# Patient Record
Sex: MALE | Race: WHITE | Employment: FULL TIME | URBAN - METROPOLITAN AREA
[De-identification: names, ages, dates, MRNs, and addresses within clinical notes are randomized per-mention and may not be internally consistent; named-entity substitution may affect disease eponyms.]

---

## 2018-12-14 ENCOUNTER — THERAPY VISIT (OUTPATIENT)
Dept: PHYSICAL THERAPY | Facility: CLINIC | Age: 28
End: 2018-12-14
Payer: COMMERCIAL

## 2018-12-14 DIAGNOSIS — M25.511 SHOULDER PAIN, RIGHT: Primary | ICD-10-CM

## 2018-12-14 PROCEDURE — 97161 PT EVAL LOW COMPLEX 20 MIN: CPT | Mod: GP | Performed by: PHYSICAL THERAPIST

## 2018-12-14 PROCEDURE — 97110 THERAPEUTIC EXERCISES: CPT | Mod: GP | Performed by: PHYSICAL THERAPIST

## 2018-12-14 NOTE — PROGRESS NOTES
[facility] Initial Evaluation  Subjective:  The history is provided by the patient.   Jimenez Barrientos is a 28 year old male with a right shoulder condition.  Condition occurred with:  Repetition/overuse.  Condition occurred: at home, in the community and during recreation/sport.  This is a chronic condition  Pt reports chronic 3 year history of R shoulder/scapular pain. No injury, reports gradual onset after pushing snowblower up a hill. Describes pain as a dull ache and are located on medial inferior board of R scapula and can get radiating symptoms from posterolateral R shoulder down the back of the arm to digits 4 and 5. Working on computer causes extra symptoms, gets symptoms after 1-2 hours. Also gets pain putting R UE behind body (tucking shirt in). Neck stretching helps and WB through R UE (hand stands or side planks) helps with symptoms. Works as  doing a lot of lab/computer work. Has history of R clavicular fx and minor R shoulder pain/issues..    Patient reports pain:  Lateral.  Radiates to:  Upper arm and lower arm (Digits 4-5).  Pain is described as aching and is constant and reported as 1/10.  Associated symptoms:  Catching and locking.        Special tests:  X-ray (No significant findings in shoulder x-ray).  Previous treatment: Massage.  There was mild improvement following previous treatment.  General health as reported by patient is excellent.          Current occupation is .                                    Objective:  System              Cervical/Thoracic Evaluation  Cervical AROM: normal (No increase in sxs with over pressure)   Thoracic AROM: normal        Cervical Myotomes:  normal                    Neural Tension:      Right side:  Ulnar and Median positive.  Right side:  Radial  negative.  Cervical Dermatomes:  normal                    Cervical Palpation:  : Tightness at scalenes.                   Shoulder Evaluation:  ROM:  AROM:   normal                                  Strength:  normal                      Stability Testing:  normal      Special Tests:        Right shoulder negative for the following special tests:Labral; Impingement; Bursal; Neural Tension and Rotator cuff tear  Palpation:  Palpation assessed shoulder: Tightness noted in pec minor.      Mobility Tests:            Scapulothoracic left:  Normal  Scapulothoracic right:  Normal                                       General     ROS    Assessment/Plan:    Patient is a 28 year old male with right side shoulder complaints.    Patient has the following significant findings with corresponding treatment plan.                Diagnosis 1:  R shoulder pain  Pain -  hot/cold therapy, US, electric stimulation, mechanical traction, manual therapy, splint/taping/bracing/orthotics, self management, education, directional preference exercise and home program  Decreased ROM/flexibility - manual therapy, therapeutic exercise and home program  Decreased joint mobility - manual therapy, therapeutic exercise and home program  Decreased strength - therapeutic exercise, therapeutic activities and home program  Impaired muscle performance - neuro re-education and home program  Decreased function - therapeutic activities and home program    Therapy Evaluation Codes:   1) History comprised of:   Personal factors that impact the plan of care:      None.    Comorbidity factors that impact the plan of care are:      None.     Medications impacting care: None.  2) Examination of Body Systems comprised of:   Body structures and functions that impact the plan of care:      Shoulder.   Activity limitations that impact the plan of care are:      Dressing, Lifting and Reading/Computer work.  3) Clinical presentation characteristics are:   Stable/Uncomplicated.  4) Decision-Making    Low complexity using standardized patient assessment instrument and/or measureable assessment of functional outcome.  Cumulative  Therapy Evaluation is: Low complexity.    Previous and current functional limitations:  (See Goal Flow Sheet for this information)    Short term and Long term goals: (See Goal Flow Sheet for this information)     Communication ability:  Patient appears to be able to clearly communicate and understand verbal and written communication and follow directions correctly.  Treatment Explanation - The following has been discussed with the patient:   RX ordered/plan of care  Anticipated outcomes  Possible risks and side effects  This patient would benefit from PT intervention to resume normal activities.   Rehab potential is good.    Frequency:  1 X week, once daily  Duration:  for 6 weeks  Discharge Plan:  Achieve all LTG.  Independent in home treatment program.  Reach maximal therapeutic benefit.    Please refer to the daily flowsheet for treatment today, total treatment time and time spent performing 1:1 timed codes.

## 2018-12-20 ENCOUNTER — THERAPY VISIT (OUTPATIENT)
Dept: PHYSICAL THERAPY | Facility: CLINIC | Age: 28
End: 2018-12-20
Payer: COMMERCIAL

## 2018-12-20 DIAGNOSIS — G54.0 THORACIC OUTLET SYNDROME: Primary | ICD-10-CM

## 2018-12-20 PROCEDURE — 97110 THERAPEUTIC EXERCISES: CPT | Mod: GP | Performed by: PHYSICAL THERAPIST

## 2018-12-20 PROCEDURE — 97112 NEUROMUSCULAR REEDUCATION: CPT | Mod: GP | Performed by: PHYSICAL THERAPIST

## 2018-12-20 PROCEDURE — 97140 MANUAL THERAPY 1/> REGIONS: CPT | Mod: GP | Performed by: PHYSICAL THERAPIST

## 2018-12-28 ENCOUNTER — THERAPY VISIT (OUTPATIENT)
Dept: PHYSICAL THERAPY | Facility: CLINIC | Age: 28
End: 2018-12-28
Payer: COMMERCIAL

## 2018-12-28 DIAGNOSIS — M25.511 SHOULDER PAIN, RIGHT: Primary | ICD-10-CM

## 2018-12-28 PROCEDURE — 97110 THERAPEUTIC EXERCISES: CPT | Mod: GP | Performed by: PHYSICAL THERAPIST

## 2018-12-28 PROCEDURE — 97112 NEUROMUSCULAR REEDUCATION: CPT | Mod: GP | Performed by: PHYSICAL THERAPIST

## 2018-12-28 PROCEDURE — 97140 MANUAL THERAPY 1/> REGIONS: CPT | Mod: GP | Performed by: PHYSICAL THERAPIST

## 2019-02-21 ENCOUNTER — THERAPY VISIT (OUTPATIENT)
Dept: PHYSICAL THERAPY | Facility: CLINIC | Age: 29
End: 2019-02-21
Payer: COMMERCIAL

## 2019-02-21 DIAGNOSIS — M24.551 RIGHT HIP FLEXOR TIGHTNESS: ICD-10-CM

## 2019-02-21 DIAGNOSIS — M25.561 PATELLOFEMORAL ARTHRALGIA OF RIGHT KNEE: ICD-10-CM

## 2019-02-21 PROCEDURE — 97110 THERAPEUTIC EXERCISES: CPT | Mod: GP | Performed by: PHYSICAL THERAPIST

## 2019-02-21 PROCEDURE — 97161 PT EVAL LOW COMPLEX 20 MIN: CPT | Mod: GP | Performed by: PHYSICAL THERAPIST

## 2019-02-21 ASSESSMENT — ACTIVITIES OF DAILY LIVING (ADL)
LIMPING: I HAVE THE SYMPTOM BUT IT DOES NOT AFFECT MY ACTIVITY
GO UP STAIRS: ACTIVITY IS MINIMALLY DIFFICULT
RAW_SCORE: 55
WALKING_DOWN_STEEP_HILLS: SLIGHT DIFFICULTY
HOS_ADL_ITEM_SCORE_TOTAL: 49
WALKING_INITIALLY: SLIGHT DIFFICULTY
WEAKNESS: I HAVE THE SYMPTOM BUT IT DOES NOT AFFECT MY ACTIVITY
SWELLING: I DO NOT HAVE THE SYMPTOM
GO DOWN STAIRS: ACTIVITY IS MINIMALLY DIFFICULT
HOW_WOULD_YOU_RATE_THE_OVERALL_FUNCTION_OF_YOUR_KNEE_DURING_YOUR_USUAL_DAILY_ACTIVITIES?: NEARLY NORMAL
KNEEL ON THE FRONT OF YOUR KNEE: ACTIVITY IS SOMEWHAT DIFFICULT
KNEE_ACTIVITY_OF_DAILY_LIVING_SUM: 55
GETTING_INTO_AND_OUT_OF_AN_AVERAGE_CAR: SLIGHT DIFFICULTY
RECREATIONAL_ACTIVITIES: MODERATE DIFFICULTY
GETTING_INTO_AND_OUT_OF_A_BATHTUB: SLIGHT DIFFICULTY
STAND: ACTIVITY IS MINIMALLY DIFFICULT
GOING_DOWN_1_FLIGHT_OF_STAIRS: SLIGHT DIFFICULTY
SIT WITH YOUR KNEE BENT: ACTIVITY IS MINIMALLY DIFFICULT
WALKING_15_MINUTES_OR_GREATER: SLIGHT DIFFICULTY
GIVING WAY, BUCKLING OR SHIFTING OF KNEE: I DO NOT HAVE THE SYMPTOM
DEEP_SQUATTING: MODERATE DIFFICULTY
SITTING_FOR_15_MINUTES: SLIGHT DIFFICULTY
TWISTING/PIVOTING_ON_INVOLVED_LEG: SLIGHT DIFFICULTY
SQUAT: ACTIVITY IS SOMEWHAT DIFFICULT
HOS_ADL_SCORE(%): 72.06
ROLLING_OVER_IN_BED: SLIGHT DIFFICULTY
HEAVY_WORK: SLIGHT DIFFICULTY
STIFFNESS: I DO NOT HAVE THE SYMPTOM
HOS_ADL_COUNT: 17
WALKING_UP_STEEP_HILLS: SLIGHT DIFFICULTY
PUTTING_ON_SOCKS_AND_SHOES: SLIGHT DIFFICULTY
WALK: ACTIVITY IS MINIMALLY DIFFICULT
STANDING_FOR_15_MINUTES: SLIGHT DIFFICULTY
STEPPING_UP_AND_DOWN_CURBS: SLIGHT DIFFICULTY
AS_A_RESULT_OF_YOUR_KNEE_INJURY,_HOW_WOULD_YOU_RATE_YOUR_CURRENT_LEVEL_OF_DAILY_ACTIVITY?: NORMAL
GOING_UP_1_FLIGHT_OF_STAIRS: SLIGHT DIFFICULTY
HOS_ADL_HIGHEST_POTENTIAL_SCORE: 68
HOW_WOULD_YOU_RATE_THE_CURRENT_FUNCTION_OF_YOUR_KNEE_DURING_YOUR_USUAL_DAILY_ACTIVITIES_ON_A_SCALE_FROM_0_TO_100_WITH_100_BEING_YOUR_LEVEL_OF_KNEE_FUNCTION_PRIOR_TO_YOUR_INJURY_AND_0_BEING_THE_INABILITY_TO_PERFORM_ANY_OF_YOUR_USUAL_DAILY_ACTIVITIES?: 90
LIGHT_TO_MODERATE_WORK: SLIGHT DIFFICULTY
KNEE_ACTIVITY_OF_DAILY_LIVING_SCORE: 78.57
PAIN: THE SYMPTOM AFFECTS MY ACTIVITY MODERATELY
HOW_WOULD_YOU_RATE_YOUR_CURRENT_LEVEL_OF_FUNCTION_DURING_YOUR_USUAL_ACTIVITIES_OF_DAILY_LIVING_FROM_0_TO_100_WITH_100_BEING_YOUR_LEVEL_OF_FUNCTION_PRIOR_TO_YOUR_HIP_PROBLEM_AND_0_BEING_THE_INABILITY_TO_PERFORM_ANY_OF_YOUR_USUAL_DAILY_ACTIVITIES?: 92
WALKING_APPROXIMATELY_10_MINUTES: SLIGHT DIFFICULTY
RISE FROM A CHAIR: ACTIVITY IS MINIMALLY DIFFICULT

## 2019-02-21 NOTE — LETTER
[facility] PHYSICAL THERAPY  [address] [address]  [phone]    2019    Re: Jimenez Barrientos   :   1990  MRN:  [id number]   REFERRING PHYSICIAN:   Jason Alaniz    [facility] PHYSICAL THERAPY    Date of Initial Evaluation:  2019  Visits:  Rxs Used: 1  Reason for Referral:     Patellofemoral arthralgia of right knee  Right hip flexor tightness    EVALUATION SUMMARY    [facility] Initial Evaluation  Subjective:  The history is provided by the patient. Jimenez Barrientos is a 28 year old male with a right knee condition.  Condition occurred with:  Repetition/overuse.  Condition occurred: during recreation/sport.  This is a chronic condition  PT reports a 6 year history of R knee pain. Locates pain around medial and superior boarder of R patella. Describes pain as dull and achy. Pain worse with biking and hiking as well as doing deep squats for working. Also notes pain at random when sitting. Pt has tried strengthening hips and that seems to have helped a little..  Patient reports pain:  Medial.    Pain is described as aching and is intermittent and reported as 3/10.  Since onset symptoms are unchanged.  General health as reported by patient is good.    Medical allergies: no.  Other surgeries include:  None reported.  Jimenez Barrientos is a 28 year old male with a right hip condition.  Condition occurred with:  Repetition/overuse.  Condition occurred: during recreation/sport.  This is a chronic condition  Pt reports 3 month history of R anterior hip pain. Started running on treadmill about 3 months ago and noticed onset of R hip pain. Locates pain in anterior aspect of hip. Describes pain as a constant dull achy pain. Pain is consistent intensity no matter what he does. Pt has tried to stretch and strengthen hip but with little progress. Also notes a lateral snapping sensation in his right hip as well, but denies pain with snapping..   Patient reports pain:  Anterior.    Pain is described as aching  and reported as 3/10.  General health as reported by patient is good.  Pertinent medical history includes:  None.  Medical allergies: no.  Other surgeries include:  None reported.  Current medications:  None as reported by the patient.  Current occupation is .  Patient is working in normal job without restrictions.      Objective:  System  Hip Evaluation  HIP AROM:  AROM:   Left Hip:     Normal    Right Hip:    Hip Strength:    Flexion:   Left: 5-/5   Pain:  Right: 5-/5   Pain:               Extension:  Left: 5-/5  Pain:Right: 5-/5    Pain:    Abduction:  Left: 5-/5     Pain:Right: 5-/5    Pain:  Knee Flexion:  Left: 5/5   Pain:Right: 5/5   Pain:  Knee Extension:  Left: 5/5   Pain:Right: 5/5    Pain:  Hip Special Testing:    Left hip negative for the following special tests:  Toshia or Fadir/Labrum   Right hip positive for the following special tests:  Toshia and Fadir/Labrum    Hip Palpation:    Right hip tenderness present at:  hip flexors       Knee Evaluation:  ROM:  AROM: normal    Strength:   Quad Set Left: Good    Pain:   Quad Set Right: Good    Pain:  Special Tests:   Left knee negative for the following special tests:  Meninscal  Right knee positive for the following tests:  Patellar Compression  Right knee negative for the following special tests:  Meniscal  Palpation:  Normal  Edema:  Normal  Mobility Testing:    Patellofemoral Medial:  Left: normal    Right: normal  Patellofemoral Lateral:  Left: normal    Right: normal  Patellofemoral Superior:  Left: normal    Right: normal  Patellofemoral Inferior:  Left: normal    Right: normal  Functional Testing:    Quad:    Single Leg Squat:  Left:      Mild loss of control, hip substitution, femoral IR and excessive anterior knee excursion  Right:       Mild loss of control, hip substitution, femoral IR and excessive anterior knee excursion  Bilateral Leg Squat:        Assessment/Plan:     Patient is a 28 year old male with right side hip and right side knee complaints.    Patient has the following significant findings with corresponding treatment plan.                Diagnosis 1:  R PFPS  Pain -  hot/cold therapy, US, electric stimulation, mechanical traction, manual therapy, splint/taping/bracing/orthotics, self management, education, directional preference exercise and home program  Decreased ROM/flexibility - manual therapy, therapeutic exercise and home program  Decreased joint mobility - manual therapy, therapeutic exercise and home program  Decreased strength - therapeutic exercise, therapeutic activities and home program  Impaired balance - neuro re-education, therapeutic activities and adaptive equipment/assistive device  Decreased proprioception - neuro re-education, therapeutic activities and home program  Impaired muscle performance - neuro re-education and home program  Decreased function - therapeutic activities and home program  Diagnosis 2:  R hip flexor pain   Pain -  hot/cold therapy, US, electric stimulation, mechanical traction, manual therapy, splint/taping/bracing/orthotics, self management, education, directional preference exercise and home program  Decreased ROM/flexibility - manual therapy, therapeutic exercise and home program  Decreased joint mobility - manual therapy, therapeutic exercise and home program  Decreased strength - therapeutic exercise, therapeutic activities and home program  Impaired balance - neuro re-education, therapeutic activities and home program  Decreased proprioception - neuro re-education, therapeutic activities and home program  Impaired muscle performance - neuro re-education and home program  Decreased function - therapeutic activities and home program  Therapy Evaluation Codes:   1) History comprised of:   Personal factors that impact the plan of care:      None.    Comorbidity factors that impact the plan of care are:      None.      Medications impacting care: None.  2) Examination of Body Systems comprised of:   Body structures and functions that impact the plan of care:      Hip and Knee.   Activity limitations that impact the plan of care are:      Dressing, Jumping, Lifting, Running, Sitting, Sports, Squatting/kneeling, Stairs, Standing, Walking and Working.  3) Clinical presentation characteristics are:   Stable/Uncomplicated.  4) Decision-Making    Low complexity using standardized patient assessment instrument and/or measureable assessment of functional outcome.  Cumulative Therapy Evaluation is: Low complexity.  Previous and current functional limitations:  (See Goal Flow Sheet for this information)    Short term and Long term goals: (See Goal Flow Sheet for this information)   Communication ability:  Patient appears to be able to clearly communicate and understand verbal and written communication and follow directions correctly.  Treatment Explanation - The following has been discussed with the patient:   RX ordered/plan of care  Anticipated outcomes  Possible risks and side effects  This patient would benefit from PT intervention to resume normal activities.   Rehab potential is good.  Frequency:  1 X week, once daily  Duration:  for 8 weeks  Discharge Plan:  Achieve all LTG.  Independent in home treatment program.  Reach maximal therapeutic benefit.        Thank you for your referral.    INQUIRIES  Therapist: JUAN JOSE GREENFIELD [facility] PHYSICAL THERAPY  [address] [address]  Phone: [phone]  Fax: [phone]

## 2019-02-21 NOTE — PROGRESS NOTES
[facility] Initial Evaluation  Subjective:  The history is provided by the patient.   Jimenez Barrientos is a 28 year old male with a right knee condition.  Condition occurred with:  Repetition/overuse.  Condition occurred: during recreation/sport.  This is a chronic condition  PT reports a 6 year history of R knee pain. Locates pain around medial and superior boarder of R patella. Describes pain as dull and achy. Pain worse with biking and hiking as well as doing deep squats for working. Also notes pain at random when sitting. Pt has tried strengthening hips and that seems to have helped a little..    Patient reports pain:  Medial.    Pain is described as aching and is intermittent and reported as 3/10.        Since onset symptoms are unchanged.        General health as reported by patient is good.    Medical allergies: no.  Other surgeries include:  None reported.                      Jimenez Barrientos is a 28 year old male with a right hip condition.  Condition occurred with:  Repetition/overuse.  Condition occurred: during recreation/sport.  This is a chronic condition  Pt reports 3 month history of R anterior hip pain. Started running on treadmill about 3 months ago and noticed onset of R hip pain. Locates pain in anterior aspect of hip. Describes pain as a constant dull achy pain. Pain is consistent intensity no matter what he does. Pt has tried to stretch and strengthen hip but with little progress. Also notes a lateral snapping sensation in his right hip as well, but denies pain with snapping..    Patient reports pain:  Anterior.    Pain is described as aching  and reported as 3/10.                General health as reported by patient is good.  Pertinent medical history includes:  None.  Medical allergies: no.  Other surgeries include:  None reported.  Current medications:  None as reported by the patient.  Current occupation is .  Patient is working in normal job without  restrictions.                                  Objective:  System                                           Hip Evaluation  HIP AROM:  AROM:   Left Hip:     Normal    Right Hip:                      Hip Strength:    Flexion:   Left: 5-/5   Pain:  Right: 5-/5   Pain:                    Extension:  Left: 5-/5  Pain:Right: 5-/5    Pain:    Abduction:  Left: 5-/5     Pain:Right: 5-/5    Pain:        Knee Flexion:  Left: 5/5   Pain:Right: 5/5   Pain:  Knee Extension:  Left: 5/5   Pain:Right: 5/5    Pain:        Hip Special Testing:      Left hip negative for the following special tests:  Toshia or Fadir/Labrum   Right hip positive for the following special tests:  Toshia and Fadir/Labrum    Hip Palpation:      Right hip tenderness present at:  hip flexors       Knee Evaluation:  ROM:  AROM: normal            Strength:         Quad Set Left: Good    Pain:   Quad Set Right: Good    Pain:    Special Tests:     Left knee negative for the following special tests:  Meninscal  Right knee positive for the following tests:  Patellar Compression  Right knee negative for the following special tests:  Meniscal  Palpation:  Normal      Edema:  Normal    Mobility Testing:      Patellofemoral Medial:  Left: normal    Right: normal  Patellofemoral Lateral:  Left: normal    Right: normal  Patellofemoral Superior:  Left: normal    Right: normal  Patellofemoral Inferior:  Left: normal    Right: normal  Functional Testing:          Quad:    Single Leg Squat:  Left:      Mild loss of control, hip substitution, femoral IR and excessive anterior knee excursion  Right:       Mild loss of control, hip substitution, femoral IR and excessive anterior knee excursion  Bilateral Leg Squat:                General     ROS    Assessment/Plan:    Patient is a 28 year old male with right side hip and right side knee complaints.    Patient has the following significant findings with corresponding treatment plan.                Diagnosis 1:  R PFPS  Pain -   hot/cold therapy, US, electric stimulation, mechanical traction, manual therapy, splint/taping/bracing/orthotics, self management, education, directional preference exercise and home program  Decreased ROM/flexibility - manual therapy, therapeutic exercise and home program  Decreased joint mobility - manual therapy, therapeutic exercise and home program  Decreased strength - therapeutic exercise, therapeutic activities and home program  Impaired balance - neuro re-education, therapeutic activities and adaptive equipment/assistive device  Decreased proprioception - neuro re-education, therapeutic activities and home program  Impaired muscle performance - neuro re-education and home program  Decreased function - therapeutic activities and home program  Diagnosis 2:  R hip flexor pain   Pain -  hot/cold therapy, US, electric stimulation, mechanical traction, manual therapy, splint/taping/bracing/orthotics, self management, education, directional preference exercise and home program  Decreased ROM/flexibility - manual therapy, therapeutic exercise and home program  Decreased joint mobility - manual therapy, therapeutic exercise and home program  Decreased strength - therapeutic exercise, therapeutic activities and home program  Impaired balance - neuro re-education, therapeutic activities and home program  Decreased proprioception - neuro re-education, therapeutic activities and home program  Impaired muscle performance - neuro re-education and home program  Decreased function - therapeutic activities and home program    Therapy Evaluation Codes:   1) History comprised of:   Personal factors that impact the plan of care:      None.    Comorbidity factors that impact the plan of care are:      None.     Medications impacting care: None.  2) Examination of Body Systems comprised of:   Body structures and functions that impact the plan of care:      Hip and Knee.   Activity limitations that impact the plan of care are:       Dressing, Jumping, Lifting, Running, Sitting, Sports, Squatting/kneeling, Stairs, Standing, Walking and Working.  3) Clinical presentation characteristics are:   Stable/Uncomplicated.  4) Decision-Making    Low complexity using standardized patient assessment instrument and/or measureable assessment of functional outcome.  Cumulative Therapy Evaluation is: Low complexity.    Previous and current functional limitations:  (See Goal Flow Sheet for this information)    Short term and Long term goals: (See Goal Flow Sheet for this information)     Communication ability:  Patient appears to be able to clearly communicate and understand verbal and written communication and follow directions correctly.  Treatment Explanation - The following has been discussed with the patient:   RX ordered/plan of care  Anticipated outcomes  Possible risks and side effects  This patient would benefit from PT intervention to resume normal activities.   Rehab potential is good.    Frequency:  1 X week, once daily  Duration:  for 8 weeks  Discharge Plan:  Achieve all LTG.  Independent in home treatment program.  Reach maximal therapeutic benefit.    Please refer to the daily flowsheet for treatment today, total treatment time and time spent performing 1:1 timed codes.

## 2019-02-27 ENCOUNTER — THERAPY VISIT (OUTPATIENT)
Dept: PHYSICAL THERAPY | Facility: CLINIC | Age: 29
End: 2019-02-27
Payer: COMMERCIAL

## 2019-02-27 DIAGNOSIS — M24.551 RIGHT HIP FLEXOR TIGHTNESS: ICD-10-CM

## 2019-02-27 DIAGNOSIS — M25.561 PATELLOFEMORAL ARTHRALGIA OF RIGHT KNEE: ICD-10-CM

## 2019-02-27 PROCEDURE — 97112 NEUROMUSCULAR REEDUCATION: CPT | Mod: GP | Performed by: PHYSICAL THERAPIST

## 2019-02-27 PROCEDURE — 97110 THERAPEUTIC EXERCISES: CPT | Mod: GP | Performed by: PHYSICAL THERAPIST

## 2019-03-06 ENCOUNTER — THERAPY VISIT (OUTPATIENT)
Dept: PHYSICAL THERAPY | Facility: CLINIC | Age: 29
End: 2019-03-06
Payer: COMMERCIAL

## 2019-03-06 DIAGNOSIS — M25.561 PATELLOFEMORAL ARTHRALGIA OF RIGHT KNEE: ICD-10-CM

## 2019-03-06 DIAGNOSIS — M24.551 RIGHT HIP FLEXOR TIGHTNESS: ICD-10-CM

## 2019-03-06 PROCEDURE — 97112 NEUROMUSCULAR REEDUCATION: CPT | Mod: GP | Performed by: PHYSICAL THERAPIST

## 2019-03-06 PROCEDURE — 97110 THERAPEUTIC EXERCISES: CPT | Mod: GP | Performed by: PHYSICAL THERAPIST

## 2019-03-20 ENCOUNTER — THERAPY VISIT (OUTPATIENT)
Dept: PHYSICAL THERAPY | Facility: CLINIC | Age: 29
End: 2019-03-20
Payer: COMMERCIAL

## 2019-03-20 DIAGNOSIS — M25.561 PATELLOFEMORAL ARTHRALGIA OF RIGHT KNEE: ICD-10-CM

## 2019-03-20 DIAGNOSIS — M24.551 RIGHT HIP FLEXOR TIGHTNESS: ICD-10-CM

## 2019-03-20 PROCEDURE — 97112 NEUROMUSCULAR REEDUCATION: CPT | Mod: GP | Performed by: PHYSICAL THERAPIST

## 2019-03-20 PROCEDURE — 97110 THERAPEUTIC EXERCISES: CPT | Mod: GP | Performed by: PHYSICAL THERAPIST

## 2019-07-11 PROBLEM — M25.561 PATELLOFEMORAL ARTHRALGIA OF RIGHT KNEE: Status: RESOLVED | Noted: 2019-02-21 | Resolved: 2019-07-11

## 2019-07-11 PROBLEM — M24.551 RIGHT HIP FLEXOR TIGHTNESS: Status: RESOLVED | Noted: 2019-02-21 | Resolved: 2019-07-11

## 2019-07-11 NOTE — PROGRESS NOTES
Discharge Note    Progress reporting period is from initial eval to Dec 28, 2018.     Jimenez failed to return for next follow up visit and current status is unknown.  Please see information below for last relevant information on current status.  Patient seen for 3 visits.    SUBJECTIVE  Subjective changes noted by patient:  Pt states his RUE and shoulder pain is much better today. Scooby to go half a work day without pain . Still gets medial scapula pain at times and also feels new pain a little higher around T4 just medial to vertebral spinous process  .  Current pain level is 1/10.     Previous pain level was  1/10.   Changes in function:  Yes (See Goal flowsheet attached for changes in current functional level)  Adverse reaction to treatment or activity: None    OBJECTIVE  Changes noted in objective findings: IR reach behind back to T6, fatigues quickly with thoracic strength ex, stiffness in vertebral PAs in thoracic spine     ASSESSMENT/PLAN  Diagnosis: R shoulder pain   DIAGP:  The encounter diagnosis was Shoulder pain, right.  STG/LTGs have been met or progress has been made towards goals:  Yes, please see goal flowsheet for most current information  Assessment of Progress: current status is unknown.    Last current status: Pt is progressing well   Self Management Plans:  HEP  I have re-evaluated this patient and find that the nature, scope, duration and intensity of the therapy is appropriate for the medical condition of the patient.  Jimenez continues to require the following intervention to meet STG and LTG's:  HEP.    Recommendations:  Discharge with current home program.  Patient to follow up with MD as needed.    Please refer to the daily flowsheet for treatment today, total treatment time and time spent performing 1:1 timed codes.

## 2019-07-11 NOTE — PROGRESS NOTES
Discharge Note    Progress reporting period is from initial eval to Mar 20, 2019.     Jimenez failed to return for next follow up visit and current status is unknown.  Please see information below for last relevant information on current status.  Patient seen for 4 visits.    SUBJECTIVE  Subjective changes noted by patient:  Pt states his sxs are slowly getting better. Has been doing rehab exercises as his regular ex routine and not doing ex that would normally do. Feels walking day after working out is less painful.  .  Current pain level is 1/10.     Previous pain level was  3/10.   Changes in function:  Yes (See Goal flowsheet attached for changes in current functional level)  Adverse reaction to treatment or activity: None    OBJECTIVE  Changes noted in objective findings: Able to hold SL balance on bosu for 30 sec R LE     ASSESSMENT/PLAN  Diagnosis: R hip flexor pain   DIAGP:  Diagnoses of Patellofemoral arthralgia of right knee and Right hip flexor tightness were pertinent to this visit.  STG/LTGs have been met or progress has been made towards goals:  Yes, please see goal flowsheet for most current information  Assessment of Progress: current status is unknown.    Last current status: Pt is progressing well   Self Management Plans:  HEP  I have re-evaluated this patient and find that the nature, scope, duration and intensity of the therapy is appropriate for the medical condition of the patient.  Jimenez continues to require the following intervention to meet STG and LTG's:  HEP.    Recommendations:  Discharge with current home program.  Patient to follow up with MD as needed.    Please refer to the daily flowsheet for treatment today, total treatment time and time spent performing 1:1 timed codes.